# Patient Record
Sex: FEMALE | Race: WHITE | NOT HISPANIC OR LATINO
[De-identification: names, ages, dates, MRNs, and addresses within clinical notes are randomized per-mention and may not be internally consistent; named-entity substitution may affect disease eponyms.]

---

## 2017-02-20 ENCOUNTER — TRANSCRIPTION ENCOUNTER (OUTPATIENT)
Age: 57
End: 2017-02-20

## 2017-05-23 ENCOUNTER — OUTPATIENT (OUTPATIENT)
Dept: OUTPATIENT SERVICES | Facility: HOSPITAL | Age: 57
LOS: 1 days | End: 2017-05-23
Payer: COMMERCIAL

## 2017-05-23 ENCOUNTER — APPOINTMENT (OUTPATIENT)
Dept: MAMMOGRAPHY | Facility: IMAGING CENTER | Age: 57
End: 2017-05-23

## 2017-05-23 ENCOUNTER — APPOINTMENT (OUTPATIENT)
Dept: ULTRASOUND IMAGING | Facility: IMAGING CENTER | Age: 57
End: 2017-05-23

## 2017-05-23 DIAGNOSIS — Z00.8 ENCOUNTER FOR OTHER GENERAL EXAMINATION: ICD-10-CM

## 2017-05-23 PROCEDURE — 76641 ULTRASOUND BREAST COMPLETE: CPT

## 2017-05-23 PROCEDURE — 77066 DX MAMMO INCL CAD BI: CPT

## 2017-05-23 PROCEDURE — G0279: CPT

## 2017-07-29 ENCOUNTER — TRANSCRIPTION ENCOUNTER (OUTPATIENT)
Age: 57
End: 2017-07-29

## 2019-01-09 ENCOUNTER — TRANSCRIPTION ENCOUNTER (OUTPATIENT)
Age: 59
End: 2019-01-09

## 2019-02-14 ENCOUNTER — APPOINTMENT (OUTPATIENT)
Dept: MAMMOGRAPHY | Facility: IMAGING CENTER | Age: 59
End: 2019-02-14
Payer: COMMERCIAL

## 2019-02-14 ENCOUNTER — OUTPATIENT (OUTPATIENT)
Dept: OUTPATIENT SERVICES | Facility: HOSPITAL | Age: 59
LOS: 1 days | End: 2019-02-14
Payer: COMMERCIAL

## 2019-02-14 ENCOUNTER — APPOINTMENT (OUTPATIENT)
Dept: ULTRASOUND IMAGING | Facility: IMAGING CENTER | Age: 59
End: 2019-02-14
Payer: COMMERCIAL

## 2019-02-14 DIAGNOSIS — Z00.8 ENCOUNTER FOR OTHER GENERAL EXAMINATION: ICD-10-CM

## 2019-02-14 PROCEDURE — 77063 BREAST TOMOSYNTHESIS BI: CPT | Mod: 26

## 2019-02-14 PROCEDURE — 77067 SCR MAMMO BI INCL CAD: CPT | Mod: 26

## 2019-02-14 PROCEDURE — 76641 ULTRASOUND BREAST COMPLETE: CPT

## 2019-02-14 PROCEDURE — 76641 ULTRASOUND BREAST COMPLETE: CPT | Mod: 26,50

## 2019-02-14 PROCEDURE — 77067 SCR MAMMO BI INCL CAD: CPT

## 2019-02-14 PROCEDURE — 77063 BREAST TOMOSYNTHESIS BI: CPT

## 2019-02-22 ENCOUNTER — OUTPATIENT (OUTPATIENT)
Dept: OUTPATIENT SERVICES | Facility: HOSPITAL | Age: 59
LOS: 1 days | End: 2019-02-22
Payer: COMMERCIAL

## 2019-02-22 ENCOUNTER — APPOINTMENT (OUTPATIENT)
Dept: MAMMOGRAPHY | Facility: IMAGING CENTER | Age: 59
End: 2019-02-22
Payer: COMMERCIAL

## 2019-02-22 DIAGNOSIS — Z12.31 ENCOUNTER FOR SCREENING MAMMOGRAM FOR MALIGNANT NEOPLASM OF BREAST: ICD-10-CM

## 2019-02-22 PROCEDURE — 77065 DX MAMMO INCL CAD UNI: CPT

## 2019-02-22 PROCEDURE — 77065 DX MAMMO INCL CAD UNI: CPT | Mod: 26,LT

## 2019-03-08 ENCOUNTER — RESULT REVIEW (OUTPATIENT)
Age: 59
End: 2019-03-08

## 2019-03-08 ENCOUNTER — OUTPATIENT (OUTPATIENT)
Dept: OUTPATIENT SERVICES | Facility: HOSPITAL | Age: 59
LOS: 1 days | End: 2019-03-08
Payer: COMMERCIAL

## 2019-03-08 ENCOUNTER — APPOINTMENT (OUTPATIENT)
Dept: MAMMOGRAPHY | Facility: IMAGING CENTER | Age: 59
End: 2019-03-08
Payer: COMMERCIAL

## 2019-03-08 DIAGNOSIS — R92.8 OTHER ABNORMAL AND INCONCLUSIVE FINDINGS ON DIAGNOSTIC IMAGING OF BREAST: ICD-10-CM

## 2019-03-08 PROCEDURE — 88305 TISSUE EXAM BY PATHOLOGIST: CPT

## 2019-03-08 PROCEDURE — 77065 DX MAMMO INCL CAD UNI: CPT | Mod: 26,LT

## 2019-03-08 PROCEDURE — 19081 BX BREAST 1ST LESION STRTCTC: CPT | Mod: LT

## 2019-03-08 PROCEDURE — A4648: CPT

## 2019-03-08 PROCEDURE — 19081 BX BREAST 1ST LESION STRTCTC: CPT

## 2019-03-08 PROCEDURE — 77065 DX MAMMO INCL CAD UNI: CPT

## 2019-03-08 PROCEDURE — 88305 TISSUE EXAM BY PATHOLOGIST: CPT | Mod: 26

## 2019-03-11 LAB — SURGICAL PATHOLOGY STUDY: SIGNIFICANT CHANGE UP

## 2020-02-11 ENCOUNTER — TRANSCRIPTION ENCOUNTER (OUTPATIENT)
Age: 60
End: 2020-02-11

## 2020-02-16 ENCOUNTER — TRANSCRIPTION ENCOUNTER (OUTPATIENT)
Age: 60
End: 2020-02-16

## 2020-08-31 ENCOUNTER — TRANSCRIPTION ENCOUNTER (OUTPATIENT)
Age: 60
End: 2020-08-31

## 2021-03-08 ENCOUNTER — TRANSCRIPTION ENCOUNTER (OUTPATIENT)
Age: 61
End: 2021-03-08

## 2021-05-07 ENCOUNTER — APPOINTMENT (OUTPATIENT)
Dept: MAMMOGRAPHY | Facility: IMAGING CENTER | Age: 61
End: 2021-05-07
Payer: COMMERCIAL

## 2021-05-07 ENCOUNTER — APPOINTMENT (OUTPATIENT)
Dept: ULTRASOUND IMAGING | Facility: IMAGING CENTER | Age: 61
End: 2021-05-07
Payer: COMMERCIAL

## 2021-05-07 ENCOUNTER — OUTPATIENT (OUTPATIENT)
Dept: OUTPATIENT SERVICES | Facility: HOSPITAL | Age: 61
LOS: 1 days | End: 2021-05-07
Payer: COMMERCIAL

## 2021-05-07 ENCOUNTER — APPOINTMENT (OUTPATIENT)
Dept: RADIOLOGY | Facility: IMAGING CENTER | Age: 61
End: 2021-05-07
Payer: COMMERCIAL

## 2021-05-07 DIAGNOSIS — Z00.8 ENCOUNTER FOR OTHER GENERAL EXAMINATION: ICD-10-CM

## 2021-05-07 PROCEDURE — 76641 ULTRASOUND BREAST COMPLETE: CPT

## 2021-05-07 PROCEDURE — 77080 DXA BONE DENSITY AXIAL: CPT | Mod: 26

## 2021-05-07 PROCEDURE — 77065 DX MAMMO INCL CAD UNI: CPT | Mod: 26,GG,RT

## 2021-05-07 PROCEDURE — 77063 BREAST TOMOSYNTHESIS BI: CPT | Mod: 26,59

## 2021-05-07 PROCEDURE — 76641 ULTRASOUND BREAST COMPLETE: CPT | Mod: 26,50

## 2021-05-07 PROCEDURE — 77067 SCR MAMMO BI INCL CAD: CPT | Mod: 26,59

## 2021-05-07 PROCEDURE — 77080 DXA BONE DENSITY AXIAL: CPT

## 2021-05-07 PROCEDURE — 77063 BREAST TOMOSYNTHESIS BI: CPT

## 2021-05-18 ENCOUNTER — TRANSCRIPTION ENCOUNTER (OUTPATIENT)
Age: 61
End: 2021-05-18

## 2021-09-11 ENCOUNTER — TRANSCRIPTION ENCOUNTER (OUTPATIENT)
Age: 61
End: 2021-09-11

## 2022-02-28 ENCOUNTER — TRANSCRIPTION ENCOUNTER (OUTPATIENT)
Age: 62
End: 2022-02-28

## 2022-06-20 ENCOUNTER — APPOINTMENT (OUTPATIENT)
Dept: OTOLARYNGOLOGY | Facility: CLINIC | Age: 62
End: 2022-06-20
Payer: COMMERCIAL

## 2022-06-20 PROCEDURE — 99244 OFF/OP CNSLTJ NEW/EST MOD 40: CPT | Mod: 25

## 2022-06-20 NOTE — CONSULT LETTER
[FreeTextEntry2] : Sky Witt MD [FreeTextEntry1] : Dear Sky,\par \par Thanks for referring Maria R Hamm for evaluation of her left vestibular schwannoma.  As you know, she is a very pleasant 62-year-old woman with a history of gradual hearing loss and asymmetric left tinnitus.  She was found to have minor asymmetry and speech discrimination and underwent MRI which showed a small vestibular schwannoma.  Otoscopic exam today shows intact normal-appearing bilateral tympanic membranes without effusion or retraction.  She has no spontaneous or gaze evoked nystagmus.  I reviewed her MRI which shows a 4 mm focus of enhancement involving the left IAC consistent with vestibular schwannoma.  I also reviewed an audiogram from your office which shows a bilateral downsloping sensorineural hearing loss with mostly symmetric pure-tone thresholds, and speech discrimination score of 100% in the right ear and 92% in the left ear.\par \par I very much agree that her MRI findings appear most consistent with a vestibular schwannoma.  We discussed options for management of vestibular schwannoma, including observation, stereotactic radiation, and surgical resection.  We discussed the risks of progressive hearing loss with each treatment strategy as well as risks specific to each treatment approach.  Because of her excellent hearing and the tumor's very small size, I recommended observation with repeat imaging 6 months from the prior study, to be performed in October 2022.  She was also instructed to followup sooner for repeat imaging with any change in symptoms that could be indicative of tumor growth, including sudden worsening of hearing loss, new onset dizziness, or severe headache.  I did  her that she is a borderline candidate for hearing aids, which may also help with reducing her tinnitus perception, but she not ready to pursue them yet.\par \par Thank you once again for the opportunity to participate in your patient's care, and I will keep you informed as to her progress.\par \par Best regards,\par \par Marcio Grant MD\par Otology/Neurotology\par [facility]\par [facility]

## 2022-06-20 NOTE — ASSESSMENT
[FreeTextEntry1] : 62-year-old woman with a history of gradual hearing loss and asymmetric left tinnitus.  She was found to have minor asymmetry and speech discrimination and underwent MRI which showed a small vestibular schwannoma.  Otoscopic exam today shows intact normal-appearing bilateral tympanic membranes without effusion or retraction.  She has no spontaneous or gaze evoked nystagmus.  I reviewed her MRI images and the report which shows a 4 mm focus of enhancement involving the left IAC consistent with vestibular schwannoma.  I also personally reviewed an audiogram from your office which shows a bilateral downsloping sensorineural hearing loss with mostly symmetric pure-tone thresholds, and speech discrimination score of 100% in the right ear and 92% in the left ear.\par \par I very much agree that her MRI findings appear most consistent with a vestibular schwannoma.  We discussed options for management of vestibular schwannoma, including observation, stereotactic radiation, and surgical resection.  We discussed the risks of progressive hearing loss with each treatment strategy as well as risks specific to each treatment approach.  Because of her excellent hearing and the tumor's very small size, I recommended observation with repeat imaging 6 months from the prior study, to be performed in October 2022.  She was also instructed to followup sooner for repeat imaging with any change in symptoms that could be indicative of tumor growth, including sudden worsening of hearing loss, new onset dizziness, or severe headache.  I did  her that she is a borderline candidate for hearing aids, which may also help with reducing her tinnitus perception, but she not ready to pursue them yet.\par Plan: Repeat imaging in 6 months

## 2022-06-20 NOTE — HISTORY OF PRESENT ILLNESS
[de-identified] : 61 y/o F pt of Dr. Witt presents for initial evaluation for left vestibular schwannoma. MRI ordered by Dr. Witt for hearing loss headaches, and facial pain that showed vestibular schwannoma. Reports increase in floaters of left eye, left facial pain, left-sided hearing loss, tinnitus. Reports tinnitus has been presents for over a year but the hearing loss has only been present for 6 months. Reports dizziness with certain movements like leaning over or leaning back, Patient denies otalgia, otorrhea, ear infections, vertigo.

## 2022-09-25 ENCOUNTER — NON-APPOINTMENT (OUTPATIENT)
Age: 62
End: 2022-09-25

## 2022-10-21 ENCOUNTER — OUTPATIENT (OUTPATIENT)
Dept: OUTPATIENT SERVICES | Facility: HOSPITAL | Age: 62
LOS: 1 days | End: 2022-10-21
Payer: COMMERCIAL

## 2022-10-21 ENCOUNTER — TRANSCRIPTION ENCOUNTER (OUTPATIENT)
Age: 62
End: 2022-10-21

## 2022-10-21 ENCOUNTER — APPOINTMENT (OUTPATIENT)
Dept: ULTRASOUND IMAGING | Facility: CLINIC | Age: 62
End: 2022-10-21

## 2022-10-21 ENCOUNTER — APPOINTMENT (OUTPATIENT)
Dept: MAMMOGRAPHY | Facility: CLINIC | Age: 62
End: 2022-10-21

## 2022-10-21 DIAGNOSIS — Z00.8 ENCOUNTER FOR OTHER GENERAL EXAMINATION: ICD-10-CM

## 2022-10-21 PROCEDURE — 76641 ULTRASOUND BREAST COMPLETE: CPT

## 2022-10-21 PROCEDURE — 77063 BREAST TOMOSYNTHESIS BI: CPT | Mod: 26

## 2022-10-21 PROCEDURE — 76641 ULTRASOUND BREAST COMPLETE: CPT | Mod: 26,50

## 2022-10-21 PROCEDURE — 77067 SCR MAMMO BI INCL CAD: CPT

## 2022-10-21 PROCEDURE — 77067 SCR MAMMO BI INCL CAD: CPT | Mod: 26

## 2022-10-21 PROCEDURE — 77063 BREAST TOMOSYNTHESIS BI: CPT

## 2022-11-16 ENCOUNTER — NON-APPOINTMENT (OUTPATIENT)
Age: 62
End: 2022-11-16

## 2023-06-26 ENCOUNTER — APPOINTMENT (OUTPATIENT)
Dept: OTOLARYNGOLOGY | Facility: CLINIC | Age: 63
End: 2023-06-26
Payer: MEDICARE

## 2023-06-26 DIAGNOSIS — R44.8 OTHER SYMPTOMS AND SIGNS INVOLVING GENERAL SENSATIONS AND PERCEPTIONS: ICD-10-CM

## 2023-06-26 DIAGNOSIS — H93.293 OTHER ABNORMAL AUDITORY PERCEPTIONS, BILATERAL: ICD-10-CM

## 2023-06-26 DIAGNOSIS — R42 DIZZINESS AND GIDDINESS: ICD-10-CM

## 2023-06-26 DIAGNOSIS — H90.5 UNSPECIFIED SENSORINEURAL HEARING LOSS: ICD-10-CM

## 2023-06-26 DIAGNOSIS — D23.22 OTHER BENIGN NEOPLASM OF SKIN OF LEFT EAR AND EXTERNAL AURICULAR CANAL: ICD-10-CM

## 2023-06-26 DIAGNOSIS — J32.9 CHRONIC SINUSITIS, UNSPECIFIED: ICD-10-CM

## 2023-06-26 PROCEDURE — 31231 NASAL ENDOSCOPY DX: CPT

## 2023-06-26 PROCEDURE — 99213 OFFICE O/P EST LOW 20 MIN: CPT | Mod: 25

## 2023-06-26 PROCEDURE — 92567 TYMPANOMETRY: CPT

## 2023-06-26 PROCEDURE — 92557 COMPREHENSIVE HEARING TEST: CPT

## 2023-06-26 NOTE — ASSESSMENT
[FreeTextEntry1] : \par Maria R Hamm presents for reevaluation of her left vestibular schwannoma.  Otoscopic exam today again is unremarkable and bilateral facial nerve function is normal.  A new audiogram shows stable mostly symmetric sensorineural hearing loss, similar to the results from your office from 2022.  I reviewed her recent MRI images which again show a 5 mm focus of enhancement involving the left internal auditory canal.\par \par Because of the stability in tumor size and her continued excellent hearing, I recommended observation with repeat imaging in 1 year.  She knows to contact the office for sooner follow-up with any sudden change in hearing, new onset vertigo, or severe headache.

## 2023-06-26 NOTE — REASON FOR VISIT
[Subsequent Evaluation] : a subsequent evaluation for [FreeTextEntry2] : gradual hearing loss and asymmetric left tinnitus

## 2023-06-26 NOTE — CONSULT LETTER
[FreeTextEntry2] : Sky Witt MD [FreeTextEntry1] : Dear Sky,\par \par Maria R Hamm presents for reevaluation of her left vestibular schwannoma.  Otoscopic exam today again is unremarkable and bilateral facial nerve function is normal.  A new audiogram shows stable mostly symmetric sensorineural hearing loss, similar to the results from your office from 2022.  I reviewed her recent MRI images which again show a 5 mm focus of enhancement involving the left internal auditory canal.\par \par Because of the stability in tumor size and her continued excellent hearing, I recommended observation with repeat imaging in 1 year.  She knows to contact the office for sooner follow-up with any sudden change in hearing, new onset vertigo, or severe headache.\par \par Thank you once again for the opportunity to participate in your patient's care, and I will keep you informed as to her progress.\par \par Best regards,\par \par Marcio Grant MD\par Otology/Neurotology\par [facility]\par [facility]

## 2023-06-26 NOTE — PROCEDURE
[FreeTextEntry6] : Procedure note: Nasal endoscopy\par \par Description of Procedure:  Nasal endoscopy was performed because of recalcitrant symptoms of nasal obstruction and/or chronic rhinitis, and anterior anatomic abnormalities precluding visualization.  Using a flexible endoscope with sheath, the nasal mucosa, septum, turbinates, and ostiomeatal complex were examined.  \par \par Nasal mucosa findings:  the nasal mucosa was mildly edematous.\par Septum findings:  the septum showed no abnormalities.\par Nasopharynx findings: Nasopharynx was clear.\par Middle meatus findings:  the middle meatus had no abnormalities.\par Sinuses findings:  the paranasal sinuses had no abnormalities.\par

## 2023-06-26 NOTE — HISTORY OF PRESENT ILLNESS
[de-identified] : 62 y/o F presents for follow up of gradual hearing loss and asymmetric left tinnitus\par eye floaters and left facial pain have resolved since last visit\par stable hearing loss; denies subjective changes in hearing,\par constant tinnitus in both ears; that sounds like a hissing sound\par denies any new issues with pain or drainage\par Also reports facial pressure and fluid sensation in sinuses, as well as allergic rhinitis.

## 2024-01-14 ENCOUNTER — NON-APPOINTMENT (OUTPATIENT)
Age: 64
End: 2024-01-14

## 2024-03-06 ENCOUNTER — APPOINTMENT (OUTPATIENT)
Dept: ULTRASOUND IMAGING | Facility: HOSPITAL | Age: 64
End: 2024-03-06
Payer: MEDICARE

## 2024-03-06 ENCOUNTER — APPOINTMENT (OUTPATIENT)
Dept: MAMMOGRAPHY | Facility: HOSPITAL | Age: 64
End: 2024-03-06
Payer: MEDICARE

## 2024-03-06 ENCOUNTER — APPOINTMENT (OUTPATIENT)
Dept: RADIOLOGY | Facility: HOSPITAL | Age: 64
End: 2024-03-06
Payer: MEDICARE

## 2024-03-06 ENCOUNTER — OUTPATIENT (OUTPATIENT)
Dept: OUTPATIENT SERVICES | Facility: HOSPITAL | Age: 64
LOS: 1 days | End: 2024-03-06
Payer: MEDICARE

## 2024-03-06 DIAGNOSIS — R92.2 INCONCLUSIVE MAMMOGRAM: ICD-10-CM

## 2024-03-06 DIAGNOSIS — Z12.31 ENCOUNTER FOR SCREENING MAMMOGRAM FOR MALIGNANT NEOPLASM OF BREAST: ICD-10-CM

## 2024-03-06 DIAGNOSIS — Z78.0 ASYMPTOMATIC MENOPAUSAL STATE: ICD-10-CM

## 2024-03-06 DIAGNOSIS — R92.30 DENSE BREASTS, UNSPECIFIED: ICD-10-CM

## 2024-03-06 PROCEDURE — 77067 SCR MAMMO BI INCL CAD: CPT | Mod: 26

## 2024-03-06 PROCEDURE — 77063 BREAST TOMOSYNTHESIS BI: CPT | Mod: 26

## 2024-03-06 PROCEDURE — 76641 ULTRASOUND BREAST COMPLETE: CPT | Mod: 26,50,GY

## 2024-03-06 PROCEDURE — 77080 DXA BONE DENSITY AXIAL: CPT

## 2024-03-06 PROCEDURE — 77080 DXA BONE DENSITY AXIAL: CPT | Mod: 26

## 2024-03-06 PROCEDURE — 77067 SCR MAMMO BI INCL CAD: CPT

## 2024-03-06 PROCEDURE — 77063 BREAST TOMOSYNTHESIS BI: CPT

## 2024-03-06 PROCEDURE — 76641 ULTRASOUND BREAST COMPLETE: CPT

## 2024-05-07 ENCOUNTER — TRANSCRIPTION ENCOUNTER (OUTPATIENT)
Age: 64
End: 2024-05-07

## 2024-12-16 ENCOUNTER — APPOINTMENT (OUTPATIENT)
Dept: OTOLARYNGOLOGY | Facility: CLINIC | Age: 64
End: 2024-12-16
Payer: MEDICARE

## 2024-12-16 VITALS
DIASTOLIC BLOOD PRESSURE: 79 MMHG | HEIGHT: 62.5 IN | WEIGHT: 128 LBS | SYSTOLIC BLOOD PRESSURE: 123 MMHG | HEART RATE: 62 BPM | BODY MASS INDEX: 22.97 KG/M2

## 2024-12-16 DIAGNOSIS — H93.293 OTHER ABNORMAL AUDITORY PERCEPTIONS, BILATERAL: ICD-10-CM

## 2024-12-16 DIAGNOSIS — H90.5 UNSPECIFIED SENSORINEURAL HEARING LOSS: ICD-10-CM

## 2024-12-16 DIAGNOSIS — R42 DIZZINESS AND GIDDINESS: ICD-10-CM

## 2024-12-16 DIAGNOSIS — D23.22 OTHER BENIGN NEOPLASM OF SKIN OF LEFT EAR AND EXTERNAL AURICULAR CANAL: ICD-10-CM

## 2024-12-16 PROCEDURE — 99203 OFFICE O/P NEW LOW 30 MIN: CPT | Mod: 25

## 2024-12-16 PROCEDURE — 92557 COMPREHENSIVE HEARING TEST: CPT

## 2024-12-16 PROCEDURE — 92504 EAR MICROSCOPY EXAMINATION: CPT

## 2024-12-16 PROCEDURE — 92567 TYMPANOMETRY: CPT

## 2025-05-24 ENCOUNTER — NON-APPOINTMENT (OUTPATIENT)
Age: 65
End: 2025-05-24